# Patient Record
Sex: FEMALE | Race: WHITE | URBAN - METROPOLITAN AREA
[De-identification: names, ages, dates, MRNs, and addresses within clinical notes are randomized per-mention and may not be internally consistent; named-entity substitution may affect disease eponyms.]

---

## 2023-02-23 ENCOUNTER — OFFICE VISIT (OUTPATIENT)
Facility: LOCATION | Age: 63
End: 2023-02-23
Payer: COMMERCIAL

## 2023-02-23 DIAGNOSIS — H04.209 EPIPHORA: Primary | ICD-10-CM

## 2023-02-23 PROCEDURE — 92004 COMPRE OPH EXAM NEW PT 1/>: CPT | Performed by: OPTOMETRIST

## 2023-02-23 RX ORDER — POLYMYXIN B SULFATE AND TRIMETHOPRIM SULFATE 100000; 1 [USP'U]/ML; MG/ML
SOLUTION/ DROPS OPHTHALMIC
Qty: 5 | Refills: 0 | Status: ACTIVE
Start: 2023-02-23

## 2023-02-23 ASSESSMENT — INTRAOCULAR PRESSURE
OD: 17
OS: 18

## 2023-02-23 NOTE — IMPRESSION/PLAN
Impression: Epiphora: H04.209. -LLL with pain and mild swelling that occurs intermittently, possible NLD obstruction Plan: Patient advised of all ocular findings. Discussed possible etiology. No discharge noted today from the puncta although mild inflammation and pain noted. Recommend starting Polytrim TID OS and patient may continue warm compresses with dry eye mask PRN. Recommend evaluation with Dr. Adeline Little for further management and treatment.

## 2023-03-29 ENCOUNTER — OFFICE VISIT (OUTPATIENT)
Facility: LOCATION | Age: 63
End: 2023-03-29
Payer: COMMERCIAL

## 2023-03-29 DIAGNOSIS — H04.222 EPIPHORA DUE TO INSUFFICIENT DRAINAGE, LEFT SIDE: ICD-10-CM

## 2023-03-29 DIAGNOSIS — H04.412 CHRONIC DACRYOCYSTITIS OF LEFT LACRIMAL PASSAGE: Primary | ICD-10-CM

## 2023-03-29 PROCEDURE — 99204 OFFICE O/P NEW MOD 45 MIN: CPT | Performed by: OPHTHALMOLOGY

## 2023-03-29 PROCEDURE — 68801 DILATE TEAR DUCT OPENING: CPT | Performed by: OPHTHALMOLOGY

## 2023-03-29 PROCEDURE — 68810 PROBE NASOLACRIMAL DUCT: CPT | Performed by: OPHTHALMOLOGY

## 2023-03-29 RX ORDER — GENTAMICIN SULFATE 3 MG/ML
0.3 % SOLUTION/ DROPS OPHTHALMIC
Qty: 5 | Refills: 0 | Status: ACTIVE
Start: 2023-03-29

## 2023-03-29 ASSESSMENT — INTRAOCULAR PRESSURE
OD: 13
OS: 13

## 2023-03-29 NOTE — IMPRESSION/PLAN
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 Plan: Patient examined. Chart review. Patient has signs, symptoms and findings consistent with tearing due a blocked nasolacrimal duct. This was diagrammatically described. Patient voiced understanding. Discussed known options for management (do nothing, conservative management, and surgical intervention.) Probing & irrigation was preformed today. Tear drain blocked, RX'd Gentamicin TID x 10 days. Use Afrin nasal Spray TID for three days. F/U by phone with me in 3 days. If no improvement will schedule patient for DCR.

## 2024-02-09 ENCOUNTER — OFFICE VISIT (OUTPATIENT)
Dept: URBAN - METROPOLITAN AREA CLINIC 43 | Facility: CLINIC | Age: 64
End: 2024-02-09
Payer: COMMERCIAL

## 2024-02-09 DIAGNOSIS — H43.313 VITREOUS MEMBRANES AND STRANDS, BILATERAL: ICD-10-CM

## 2024-02-09 DIAGNOSIS — H04.222 EPIPHORA DUE TO INSUFFICIENT DRAINAGE, LEFT SIDE: Primary | ICD-10-CM

## 2024-02-09 DIAGNOSIS — H25.13 AGE-RELATED NUCLEAR CATARACT, BILATERAL: ICD-10-CM

## 2024-02-09 PROCEDURE — 92134 CPTRZ OPH DX IMG PST SGM RTA: CPT

## 2024-02-09 PROCEDURE — 99214 OFFICE O/P EST MOD 30 MIN: CPT

## 2024-02-09 PROCEDURE — 99204 OFFICE O/P NEW MOD 45 MIN: CPT

## 2024-02-09 ASSESSMENT — KERATOMETRY
OD: 44.00
OS: 44.13

## 2024-02-09 ASSESSMENT — INTRAOCULAR PRESSURE
OD: 16
OS: 15

## 2024-02-09 ASSESSMENT — VISUAL ACUITY
OD: 20/20
OS: 20/20

## 2024-04-09 ENCOUNTER — OFFICE VISIT (OUTPATIENT)
Dept: URBAN - METROPOLITAN AREA CLINIC 51 | Facility: CLINIC | Age: 64
End: 2024-04-09
Payer: COMMERCIAL

## 2024-04-09 DIAGNOSIS — H04.552 ACQUIRED STENOSIS OF LEFT NASOLACRIMAL DUCT: Primary | ICD-10-CM

## 2024-04-09 PROCEDURE — 68840 EXPLORE/IRRIGATE TEAR DUCTS: CPT | Performed by: OPHTHALMOLOGY

## 2024-04-09 PROCEDURE — 99214 OFFICE O/P EST MOD 30 MIN: CPT | Performed by: OPHTHALMOLOGY

## 2024-04-09 PROCEDURE — 31231 NASAL ENDOSCOPY DX: CPT | Performed by: OPHTHALMOLOGY

## 2024-07-22 ENCOUNTER — OFFICE VISIT (OUTPATIENT)
Dept: URBAN - METROPOLITAN AREA CLINIC 51 | Facility: CLINIC | Age: 64
End: 2024-07-22
Payer: COMMERCIAL

## 2024-07-22 DIAGNOSIS — H04.552 ACQUIRED STENOSIS OF LEFT NASOLACRIMAL DUCT: Primary | ICD-10-CM

## 2024-07-22 PROCEDURE — 99213 OFFICE O/P EST LOW 20 MIN: CPT | Performed by: OPHTHALMOLOGY

## 2024-10-30 ENCOUNTER — POST-OPERATIVE VISIT (OUTPATIENT)
Dept: URBAN - METROPOLITAN AREA CLINIC 44 | Facility: CLINIC | Age: 64
End: 2024-10-30
Payer: COMMERCIAL

## 2024-10-30 DIAGNOSIS — Z48.89 ENCOUNTER FOR OTHER SPECIFIED SURGICAL AFTERCARE: Primary | ICD-10-CM

## 2024-10-30 PROCEDURE — 99024 POSTOP FOLLOW-UP VISIT: CPT | Performed by: OPTOMETRIST

## 2024-11-21 ENCOUNTER — POST-OPERATIVE VISIT (OUTPATIENT)
Dept: URBAN - METROPOLITAN AREA CLINIC 44 | Facility: CLINIC | Age: 64
End: 2024-11-21
Payer: COMMERCIAL

## 2024-11-21 DIAGNOSIS — Z48.89 ENCOUNTER FOR OTHER SPECIFIED SURGICAL AFTERCARE: Primary | ICD-10-CM

## 2024-11-21 PROCEDURE — 99024 POSTOP FOLLOW-UP VISIT: CPT | Performed by: OPHTHALMOLOGY
